# Patient Record
Sex: MALE
[De-identification: names, ages, dates, MRNs, and addresses within clinical notes are randomized per-mention and may not be internally consistent; named-entity substitution may affect disease eponyms.]

---

## 2024-04-17 ENCOUNTER — APPOINTMENT (OUTPATIENT)
Dept: UROLOGY | Facility: CLINIC | Age: 34
End: 2024-04-17
Payer: COMMERCIAL

## 2024-04-17 ENCOUNTER — NON-APPOINTMENT (OUTPATIENT)
Age: 34
End: 2024-04-17

## 2024-04-17 VITALS
HEIGHT: 76 IN | BODY MASS INDEX: 13.4 KG/M2 | DIASTOLIC BLOOD PRESSURE: 81 MMHG | HEART RATE: 78 BPM | SYSTOLIC BLOOD PRESSURE: 133 MMHG | TEMPERATURE: 97.3 F | WEIGHT: 110 LBS

## 2024-04-17 DIAGNOSIS — N50.82 SCROTAL PAIN: ICD-10-CM

## 2024-04-17 PROBLEM — Z00.00 ENCOUNTER FOR PREVENTIVE HEALTH EXAMINATION: Status: ACTIVE | Noted: 2024-04-17

## 2024-04-17 PROCEDURE — 99203 OFFICE O/P NEW LOW 30 MIN: CPT

## 2024-04-17 NOTE — HISTORY OF PRESENT ILLNESS
[FreeTextEntry1] : 34 yo male with 5 weeks of intermittent left scrotal ache.  The discomfort was more noticeable the first week and then mostly subsided weeks 2 and 3, but then recurred in the last 2 weeks.  He denies any associated LUTS.  He denies flank pain, fevers, chills, nausea, or vomiting.  He denies any trauma.  He is  and monogamous.  No children yet.

## 2024-04-17 NOTE — ASSESSMENT
[FreeTextEntry1] : 32 yo male with intermittent left scrotal ache owith normal exam today.    I explained that many, if not most, cases of scrotal represent functional pain as they lack a known anatomic or pathologic cause. In the absence of an identifiable cause, the perception of pain is thought to be the result of overly sensitive nerves that sense pain out-of-proportion to what would be expected, or inappropriately sense pain to non-painful stimuli. Alternatively, abnormal neural pathways outside of the scrotum may exist wherein noxious stimuli cause referred pain to the testicle(s).  In the absence of a clear cause, I explained there is no magic bullet of treatment for the pain. Rather, our focus is on supportive measures, including symptom control and modifiable factors that can lessen pain and reduce its frequency.  These treatments include NSAIDs, scrotal support and ice.    He would like a scrotal US for peace of mind to r/o any scrotal pathology.   He will RTC for scrotal US.    Plan: 1. NSAIDs, scrotal support, and ice PRN 2. RTC for scrotal US

## 2024-04-17 NOTE — PHYSICAL EXAM
[General Appearance - Well Developed] : well developed [General Appearance - Well Nourished] : well nourished [Heart Rate And Rhythm] : heart rate and rhythm were normal [] : no respiratory distress [Abdomen Soft] : soft [Abdomen Tenderness] : non-tender [Abdomen Hernia] : no hernia was discovered [Urethral Meatus] : meatus normal [Penis Abnormality] : normal circumcised penis [Epididymis] : the epididymides were normal [Testes Mass (___cm)] : there were no testicular masses [Testes Tenderness] : no tenderness of the testes [Normal Station and Gait] : the gait and station were normal for the patient's age [Skin Color & Pigmentation] : normal skin color and pigmentation [No Focal Deficits] : no focal deficits [Oriented To Time, Place, And Person] : oriented to person, place, and time [Not Anxious] : not anxious